# Patient Record
Sex: FEMALE | Race: WHITE | NOT HISPANIC OR LATINO | Employment: FULL TIME | URBAN - METROPOLITAN AREA
[De-identification: names, ages, dates, MRNs, and addresses within clinical notes are randomized per-mention and may not be internally consistent; named-entity substitution may affect disease eponyms.]

---

## 2020-10-28 ENCOUNTER — OFFICE VISIT (OUTPATIENT)
Dept: ORTHOPEDIC SURGERY | Facility: CLINIC | Age: 60
End: 2020-10-28

## 2020-10-28 VITALS — HEIGHT: 67 IN | TEMPERATURE: 96 F | WEIGHT: 180 LBS | BODY MASS INDEX: 28.25 KG/M2

## 2020-10-28 DIAGNOSIS — M17.0 PRIMARY OSTEOARTHRITIS OF BOTH KNEES: Primary | ICD-10-CM

## 2020-10-28 PROCEDURE — 99243 OFF/OP CNSLTJ NEW/EST LOW 30: CPT | Performed by: ORTHOPAEDIC SURGERY

## 2020-10-28 RX ORDER — ATORVASTATIN CALCIUM 20 MG/1
20 TABLET, FILM COATED ORAL DAILY
COMMUNITY
Start: 2020-10-10

## 2020-10-28 RX ORDER — ESCITALOPRAM OXALATE 20 MG/1
20 TABLET ORAL DAILY
COMMUNITY
Start: 2020-10-10

## 2020-10-28 RX ORDER — ANASTROZOLE 1 MG/1
1 TABLET ORAL DAILY
COMMUNITY
Start: 2020-10-10

## 2020-10-28 NOTE — PROGRESS NOTES
Patient Name: Eloisa Hodge   YOB: 1960  Referring Primary Care Physician: Katie Fowler MD  BMI: Body mass index is 28.19 kg/m².    Chief Complaint:    Chief Complaint   Patient presents with   • Right Knee - Pain, Initial Evaluation   • Left Knee - Initial Evaluation, Pain        HPI:     Eloisa Hodge is a 60 y.o. female who presents today for evaluation of   Chief Complaint   Patient presents with   • Right Knee - Pain, Initial Evaluation   • Left Knee - Initial Evaluation, Pain   .  Gia is seen today complaining of knee pain.  Is worse on the right but it hurts on both.  She is an avid exerciser and done walking in the past she is also done some [employer] type of exercise with squats and lunges if seem to exacerbate her pain.  She hears crunching and clicking in her knees and has for a long time.  She think she may have had injections many years ago by Dr. Tellez.  She does not really like take anti-inflammatories because she has had some liver conditions in the past but she basically wanted to get them checked.      Subjective   Medications:   Home Medications:  Current Outpatient Medications on File Prior to Visit   Medication Sig   • anastrozole (ARIMIDEX) 1 MG tablet Take 1 mg by mouth Daily.   • atorvastatin (LIPITOR) 20 MG tablet Take 20 mg by mouth Daily.   • escitalopram (LEXAPRO) 20 MG tablet Take 20 mg by mouth Daily.   • POTASSIUM PO Take  by mouth.   • cholecalciferol (VITAMIN D3) 1000 UNITS tablet Take 1,000 Units by mouth Daily.   • predniSONE (DELTASONE) 10 MG tablet Take 6 tablets for 3 days 4 tablets for 3 days 2 tablets for 3 days 1 tablet for 3 days   • TRIAMTERENE PO Take  by mouth.   • [DISCONTINUED] citalopram (CeleXA) 10 MG tablet Take 10 mg by mouth Daily.     No current facility-administered medications on file prior to visit.      Current Medications:  Scheduled Meds:  Continuous Infusions:No current facility-administered medications for this visit.     PRN  "Meds:.    I have reviewed the patient's medical history in detail and updated the computerized patient record.  Review and summarization of old records includes:    Past Medical History:   Diagnosis Date   • Depression    • Hypertension         Past Surgical History:   Procedure Laterality Date   • BUNIONECTOMY Right    • FINGER SURGERY Right     5th digit        Social History     Occupational History   • Not on file   Tobacco Use   • Smoking status: Never Smoker   Substance and Sexual Activity   • Alcohol use: Yes     Alcohol/week: 10.0 standard drinks     Types: 10 Standard drinks or equivalent per week   • Drug use: Not on file   • Sexual activity: Not on file      Social History     Social History Narrative   • Not on file        Family History   Problem Relation Age of Onset   • Hypertension Father        ROS: 14 point review of systems was performed and all other systems were reviewed and are negative except for documented findings in HPI and today's encounter.     Allergies:   Allergies   Allergen Reactions   • Codeine GI Intolerance     Constitutional:  Denies fever, shaking or chills   Eyes:  Denies change in visual acuity   HENT:  Denies nasal congestion or sore throat   Respiratory:  Denies cough or shortness of breath   Cardiovascular:  Denies chest pain or severe LE edema   GI:  Denies abdominal pain, nausea, vomiting, bloody stools or diarrhea   Musculoskeletal:  Numbness, tingling, pain, or loss of motor function only as noted above in history of present illness.  : Denies painful urination or hematuria  Integument:  Denies rash, lesion or ulceration   Neurologic:  Denies headache or focal weakness  Endocrine:  Denies lymphadenopathy  Psych:  Denies confusion or change in mental status   Hem:  Denies active bleeding    OBJECTIVE:  Physical Exam: 60 y.o. female  Wt Readings from Last 3 Encounters:   10/28/20 81.6 kg (180 lb)     Ht Readings from Last 1 Encounters:   10/28/20 170.2 cm (67\")     Body " "mass index is 28.19 kg/m².  Vitals:    10/28/20 1542   Temp: 96 °F (35.6 °C)     Vital signs reviewed.     General Appearance:    Alert, cooperative, in no acute distress                  Eyes: conjunctiva clear  ENT: external ears and nose atraumatic  CV: no peripheral edema  Resp: normal respiratory effort  Skin: no rashes or wounds; normal turgor  Psych: mood and affect appropriate  Lymph: no nodes appreciated  Neuro: gross sensation intact  Vascular:  Palpable peripheral pulse in noted extremity  Musculoskeletal Extremities: Exam today shows bilateral knees are crepitation synovitis swelling especially patellofemoral crepitation she has some moderate effusion on the right with some synovitis Miguelito's causes a slight click but is not painful.  She also has some pain in the region of Baker's cyst on the right.  She walks well however and has good range of motion and no gross instability    Radiology:   AP lateral 40 degree PA x-ray bilateral knees taken the office today without comparison views available show severe arthritic change with irregularity of joint space but especially patellofemoral joints which \"bone-on-bone    Assessment:     ICD-10-CM ICD-9-CM   1. Primary osteoarthritis of both knees  M17.0 715.16        Procedures       Plan: The diagnosis(es), natural history, pathophysiology and treatment for diagnosis(es) were discussed. Opportunity given and questions answered.  Biomechanics of pertinent body areas discussed.  When appropriate, the use of ambulatory aids discussed.  BMI:  The concept of BMI body mass index and its importance and implications discussed.    EXERCISES:  Advice on benefits of, and types of regular/moderate exercise pertaining to orthopedic diagnosis(es).  MEDICATIONS:  The risks, benefits, warnings,side effects and alternatives of medications discussed.  Inflammation/pain control; with cold, heat, elevation and/or liniments discussed as appropriate  PT referral.  CONSULT: This " Consult is done at the request of a requesting provider to whom I will send this report with this rendered opinion.  MEDICAL RECORDS reviewed from other provider(s) for past and current medical history pertinent to this complaint.      10/28/2020    Much of this encounter note is an electronic transcription/translation of spoken language to printed text. The electronic translation of spoken language may permit erroneous, or at times, nonsensical words or phrases to be inadvertently transcribed; Although I have reviewed the note for such errors, some may still exist

## 2021-03-19 ENCOUNTER — BULK ORDERING (OUTPATIENT)
Dept: CASE MANAGEMENT | Facility: OTHER | Age: 61
End: 2021-03-19

## 2021-03-19 DIAGNOSIS — Z23 IMMUNIZATION DUE: ICD-10-CM
